# Patient Record
Sex: FEMALE | Race: WHITE
[De-identification: names, ages, dates, MRNs, and addresses within clinical notes are randomized per-mention and may not be internally consistent; named-entity substitution may affect disease eponyms.]

---

## 2019-03-25 NOTE — REP
RIGHT FIRST DIGIT, FOUR VIEWS:

 

There is no evidence of an acute fracture, dislocation or intrinsic bone

disease.

 

IMPRESSION:

 

No fracture or dislocation.

 

 

Electronically Signed by

Lizandro Lopez MD 03/25/2019 09:38 A

## 2021-08-19 NOTE — REP
INDICATION:

N93.9 ABNORMAL UTERINE BLEEDING.



COMPARISON:

None.



TECHNIQUE:

Transvesical and transvaginal imaging



FINDINGS:

The uterus measures 7.8 x 4.1 x 5.6 cm.  The parenchymal echo pattern is within normal

limits.  The endometrial echo complex measures 1 cm in thickness.  It is relatively

homogeneous in appearance with the exception of a mixed echo structure seen in the

fundal portion of the ECC measuring 5.4 x 3.9 x 4.9 mm.



The right ovary measures 3.1 x 2.1 x 2.2 cm and is within normal limits with an RI

0.54.



Left ovary measures 3.9 x 3.2 x 3.4 cm and is within normal limits with an RI 0.56.



IMPRESSION:

There is some focal heterogeneity of the endometrial echo complex as described above.

An endometrial polyp cannot be ruled out.  Follow-up is suggested.





<Electronically signed by Arnav Novoa > 08/19/21 [id number]

## 2022-06-20 ENCOUNTER — HOSPITAL ENCOUNTER (OUTPATIENT)
Dept: HOSPITAL 53 - M SFHCDERM | Age: 37
End: 2022-06-20
Attending: NURSE PRACTITIONER
Payer: COMMERCIAL

## 2022-06-20 DIAGNOSIS — D22.62: Primary | ICD-10-CM

## 2024-08-19 ENCOUNTER — HOSPITAL ENCOUNTER (OUTPATIENT)
Dept: HOSPITAL 53 - M EMP | Age: 39
End: 2024-08-19
Attending: FAMILY MEDICINE

## 2024-08-19 DIAGNOSIS — Z11.52: Primary | ICD-10-CM

## 2024-10-24 ENCOUNTER — HOSPITAL ENCOUNTER (OUTPATIENT)
Dept: HOSPITAL 53 - M EMP | Age: 39
End: 2024-10-24
Attending: FAMILY MEDICINE

## 2024-10-24 DIAGNOSIS — Z11.52: Primary | ICD-10-CM
